# Patient Record
Sex: MALE | Race: WHITE | NOT HISPANIC OR LATINO | URBAN - METROPOLITAN AREA
[De-identification: names, ages, dates, MRNs, and addresses within clinical notes are randomized per-mention and may not be internally consistent; named-entity substitution may affect disease eponyms.]

---

## 2025-05-07 ENCOUNTER — TELEPHONE (OUTPATIENT)
Dept: PRIMARY CARE | Facility: CLINIC | Age: 35
End: 2025-05-07

## 2025-05-07 ENCOUNTER — APPOINTMENT (OUTPATIENT)
Dept: PRIMARY CARE | Facility: CLINIC | Age: 35
End: 2025-05-07
Payer: COMMERCIAL

## 2025-05-07 VITALS
OXYGEN SATURATION: 98 % | WEIGHT: 230 LBS | HEART RATE: 59 BPM | DIASTOLIC BLOOD PRESSURE: 72 MMHG | SYSTOLIC BLOOD PRESSURE: 120 MMHG

## 2025-05-07 DIAGNOSIS — R00.1 BRADYCARDIA: ICD-10-CM

## 2025-05-07 DIAGNOSIS — Z00.00 ANNUAL PHYSICAL EXAM: Primary | ICD-10-CM

## 2025-05-07 DIAGNOSIS — R94.31 ABNORMAL EKG: ICD-10-CM

## 2025-05-07 DIAGNOSIS — R00.2 PALPITATIONS: ICD-10-CM

## 2025-05-07 PROCEDURE — 93000 ELECTROCARDIOGRAM COMPLETE: CPT | Performed by: STUDENT IN AN ORGANIZED HEALTH CARE EDUCATION/TRAINING PROGRAM

## 2025-05-07 PROCEDURE — 99204 OFFICE O/P NEW MOD 45 MIN: CPT | Performed by: STUDENT IN AN ORGANIZED HEALTH CARE EDUCATION/TRAINING PROGRAM

## 2025-05-07 PROCEDURE — 99385 PREV VISIT NEW AGE 18-39: CPT | Performed by: STUDENT IN AN ORGANIZED HEALTH CARE EDUCATION/TRAINING PROGRAM

## 2025-05-07 PROCEDURE — 1036F TOBACCO NON-USER: CPT | Performed by: STUDENT IN AN ORGANIZED HEALTH CARE EDUCATION/TRAINING PROGRAM

## 2025-05-07 NOTE — TELEPHONE ENCOUNTER
PT is trying to call for an appointment and scheduling is asking if PT is having a Stress Echo or a nuclear stress test.   PT is saying both, but that usually is not the case.    Call back is [phone]  Marni

## 2025-05-07 NOTE — TELEPHONE ENCOUNTER
Informed scheduling it is for stress echo and treadmill nuclear stress test. Also needs holter monitor.

## 2025-05-07 NOTE — PROGRESS NOTES
Subjective   Patient ID: Jaquan Harris is a 34 y.o. male who presents for the following    Assessment/Plan   Preventative Medicine  -Not sure about vaccines.   -Needs pneumovax soon.   -No age related cancer screening  -PHQ2: 0  -Alcohol screening done     Palpitations  Bradycardia  - EKG done: Shows sinus bradycardia with rate in 40s.  No acute ST-T changes seen.  Left axis deviation.  EKG reading is poor R wave progression as well.  Plan  - No active chest pain  - Check Holter monitor for 48 hours  - Check stress echo  - Check nuclear stress test with treadmill  - Order referral 3 given the patient  - Patient advised to avoid strenuous activity until workup is complete  - Check CBC, CMP, TSH, magnesium levels      Annual physical with moderate complexity visit requiring additional cardiovascular workup including imaging, stress test, holter monitor and labs.     HPI    34M presents to [facility], annual physical.     States that he recently starting exercising for CV health and weight loss. 2 weeks ago he started having palpitations, BLUE/LE tingling, SOB. States that after working out, symptoms would resolve after 1 hour. He states that he would then also notice that his HR would be low and < 40.     No family hx of early cardiac death.     Diet could be better.   Was previously exercising, but currently stopped due to palpitations.     Denies fevers, chills, weight loss, lightheadedness, dizziness, vision changes, sore throat, runny nose, CP, SOB, cough, n/v/d, abd pain, black/bloody stools, arthralgias, mood disturbance, or new numbness/weakness/tingling in arms/legs/face.      PMH: None   Surgeries: none    Family Hx  -Maternal: MGF with CABG/CAD  -Paternal; CAD in PGF, A-Fib in father   -Cancer: none    Social hx  -T: quit smoking in Dec 2024 (Previously only smoking for 1-2 years)  -A: previously daily drinker. Now drinking 6-12 drinks per week    Personal Hx   -Works at [employer] at [employer]   -In  a relationship   -Has 1 child     Social Drivers of Health     Tobacco Use: Medium Risk (5/7/2025)    Patient History     Smoking Tobacco Use: Former     Smokeless Tobacco Use: Never     Passive Exposure: Not on file   Alcohol Use: Not on file   Financial Resource Strain: Not on file   Food Insecurity: Not on file   Transportation Needs: Not on file   Physical Activity: Not on file   Stress: Not on file   Social Connections: Not on file   Intimate Partner Violence: Not on file   Depression: Not on file   Housing Stability: Not on file   Utilities: Not on file   Digital Equity: Not on file   Health Literacy: Not on file         Visit Vitals  /84   Pulse 59   Wt 104 kg (230 lb)   SpO2 98%   Smoking Status Former     PHYSICAL EXAM   Physical Exam       General: NAD. NCAT. Aox3   HEENT: PERRLA. EOMI. MMM. Nares patent bl.  Cardiovascular: Regular rhythm. Rate in 40s. No MRG. S1/S2 wnl.   Respiratory: CTABL. No acute respiratory distress.   GI: Soft, NT abdomen. BS present x 4.   MSK: ROM x 4. CTLS non-tender.   Extremities: No edema. Cap refill < 2 sec.   Skin: No rashes or bruises.   Neuro: Aox3. Cranial Nerves grossly intact. Motor/sensory wnl.   Psych: Mood wnl.      REVIEW OF SYSTEMS   ROS IN HPI     Allergies[1]    Current Medications[2]    Objective     No visits with results within 4 Month(s) from this visit.   Latest known visit with results is:   Legacy Encounter on 12/28/2021   Component Date Value Ref Range Status    Flu A Result 12/28/2021 NOT DETECTED  Not Detected Final    Flu B Result 12/28/2021 NOT DETECTED  Not Detected Final    SARS-CoV-2 Result 12/28/2021 NOT DETECTED  Not Detected Final    Date of Symptom Onset? (YYYYMMDD)? 12/28/2021 20,211,226   Final       Radiology: Reviewed imaging in powerchart.  Imaging  No results found.    Cardiology, Vascular, and Other Imaging  No other imaging results found for the past 7 days      Family History[3]  Social History[4]  Medical History[5]  Surgical  History[6]    Charting was completed using voice recognition technology and may include unintended errors.            [1]   Allergies  Allergen Reactions    Pollen Extracts Other     Seasonal    [2]   No current outpatient medications on file.     No current facility-administered medications for this visit.   [3] No family history on file.  [4]   Social History  Socioeconomic History    Marital status: Single   Tobacco Use    Smoking status: Former     Types: Cigarettes    Smokeless tobacco: Never   Substance and Sexual Activity    Alcohol use: Yes    Drug use: Not Currently     Types: Marijuana   [5] No past medical history on file.  [6]   Past Surgical History:  Procedure Laterality Date    OTHER SURGICAL HISTORY  01/28/2019    [place] tooth extraction

## 2025-05-08 ENCOUNTER — TELEPHONE (OUTPATIENT)
Dept: PRIMARY CARE | Facility: CLINIC | Age: 35
End: 2025-05-08
Payer: COMMERCIAL

## 2025-05-08 LAB
ALBUMIN SERPL-MCNC: 4.5 G/DL (ref 3.6–5.1)
ALP SERPL-CCNC: 77 U/L (ref 36–130)
ALT SERPL-CCNC: 25 U/L (ref 9–46)
ANION GAP SERPL CALCULATED.4IONS-SCNC: 9 MMOL/L (CALC) (ref 7–17)
AST SERPL-CCNC: 17 U/L (ref 10–40)
BASOPHILS # BLD AUTO: 18 CELLS/UL (ref 0–200)
BASOPHILS NFR BLD AUTO: 0.4 %
BILIRUB SERPL-MCNC: 0.7 MG/DL (ref 0.2–1.2)
BUN SERPL-MCNC: 16 MG/DL (ref 7–25)
CALCIUM SERPL-MCNC: 9.4 MG/DL (ref 8.6–10.3)
CHLORIDE SERPL-SCNC: 103 MMOL/L (ref 98–110)
CHOLEST SERPL-MCNC: 166 MG/DL
CHOLEST/HDLC SERPL: 3.4 (CALC)
CO2 SERPL-SCNC: 29 MMOL/L (ref 20–32)
CREAT SERPL-MCNC: 0.83 MG/DL (ref 0.6–1.26)
EGFRCR SERPLBLD CKD-EPI 2021: 119 ML/MIN/1.73M2
EOSINOPHIL # BLD AUTO: 110 CELLS/UL (ref 15–500)
EOSINOPHIL NFR BLD AUTO: 2.4 %
ERYTHROCYTE [DISTWIDTH] IN BLOOD BY AUTOMATED COUNT: 12.2 % (ref 11–15)
EST. AVERAGE GLUCOSE BLD GHB EST-MCNC: 105 MG/DL
EST. AVERAGE GLUCOSE BLD GHB EST-SCNC: 5.8 MMOL/L
GLUCOSE SERPL-MCNC: 102 MG/DL (ref 65–139)
HBA1C MFR BLD: 5.3 %
HCT VFR BLD AUTO: 44 % (ref 38.5–50)
HDLC SERPL-MCNC: 49 MG/DL
HGB BLD-MCNC: 14.7 G/DL (ref 13.2–17.1)
LDLC SERPL CALC-MCNC: 92 MG/DL (CALC)
LYMPHOCYTES # BLD AUTO: 1398 CELLS/UL (ref 850–3900)
LYMPHOCYTES NFR BLD AUTO: 30.4 %
MCH RBC QN AUTO: 29.7 PG (ref 27–33)
MCHC RBC AUTO-ENTMCNC: 33.4 G/DL (ref 32–36)
MCV RBC AUTO: 88.9 FL (ref 80–100)
MONOCYTES # BLD AUTO: 409 CELLS/UL (ref 200–950)
MONOCYTES NFR BLD AUTO: 8.9 %
NEUTROPHILS # BLD AUTO: 2663 CELLS/UL (ref 1500–7800)
NEUTROPHILS NFR BLD AUTO: 57.9 %
NONHDLC SERPL-MCNC: 117 MG/DL (CALC)
PLATELET # BLD AUTO: 171 THOUSAND/UL (ref 140–400)
PMV BLD REES-ECKER: 11.2 FL (ref 7.5–12.5)
POTASSIUM SERPL-SCNC: 4 MMOL/L (ref 3.5–5.3)
PROT SERPL-MCNC: 7.1 G/DL (ref 6.1–8.1)
RBC # BLD AUTO: 4.95 MILLION/UL (ref 4.2–5.8)
SODIUM SERPL-SCNC: 141 MMOL/L (ref 135–146)
TRIGL SERPL-MCNC: 149 MG/DL
TSH SERPL-ACNC: 1.22 MIU/L (ref 0.4–4.5)
WBC # BLD AUTO: 4.6 THOUSAND/UL (ref 3.8–10.8)

## 2025-05-08 NOTE — TELEPHONE ENCOUNTER
Requested new order for 2D echo - spoke with Dr. Ordonez yesterday and informed scheduling this is the test that needs done. Informed patient.

## 2025-05-09 ENCOUNTER — PATIENT MESSAGE (OUTPATIENT)
Dept: PRIMARY CARE | Facility: CLINIC | Age: 35
End: 2025-05-09
Payer: COMMERCIAL

## 2025-05-16 ENCOUNTER — DOCUMENTATION (OUTPATIENT)
Dept: PRIMARY CARE | Facility: CLINIC | Age: 35
End: 2025-05-16
Payer: COMMERCIAL

## 2025-05-16 DIAGNOSIS — I49.9 CARDIAC ARRHYTHMIA, UNSPECIFIED CARDIAC ARRHYTHMIA TYPE: Primary | ICD-10-CM

## 2025-05-16 DIAGNOSIS — R94.39 ABNORMAL NUCLEAR STRESS TEST: ICD-10-CM

## 2025-05-16 NOTE — PROGRESS NOTES
Received call from Cardiologist (Dr Felix) at [facility] who was performing patients nuclear stress test.     Patient had arrhythmia (reported ventricular bigemcy and possible Atrial Fib) noted during exertion that requires further workup. Beta blocker is not being recommended due to patient having resting HR in 40s. No ischemic  changes reported at this time.     Per cardio recs, will order 2 week holter monitor through [facility] and refer to EP (Dr Rowe) at  Cardiology.     Pt notified and advised to obtain order for holter and EP referral from his mychart. He understands and will schedule both.

## 2025-05-19 ENCOUNTER — PATIENT MESSAGE (OUTPATIENT)
Dept: PRIMARY CARE | Facility: CLINIC | Age: 35
End: 2025-05-19
Payer: COMMERCIAL

## 2025-05-20 ENCOUNTER — TELEPHONE (OUTPATIENT)
Dept: PRIMARY CARE | Facility: CLINIC | Age: 35
End: 2025-05-20
Payer: COMMERCIAL

## 2025-05-20 NOTE — TELEPHONE ENCOUNTER
----- Message from Shaylee Ordonez sent at 5/20/2025 11:31 AM EDT -----  Referred to EP last week. Pt to schedule an appointment.   ----- Message -----  From: Sharron Prakash - Imaging Results In  Sent: 5/16/2025   2:17 PM EDT  To: Shaylee Ordonez MD